# Patient Record
Sex: FEMALE | Race: WHITE | NOT HISPANIC OR LATINO | Employment: FULL TIME | URBAN - METROPOLITAN AREA
[De-identification: names, ages, dates, MRNs, and addresses within clinical notes are randomized per-mention and may not be internally consistent; named-entity substitution may affect disease eponyms.]

---

## 2024-04-22 ENCOUNTER — TRANSCRIBE ORDERS (OUTPATIENT)
Dept: NEUROLOGY | Facility: CLINIC | Age: 39
End: 2024-04-22

## 2024-04-22 DIAGNOSIS — G56.03 CARPAL TUNNEL SYNDROME, BILATERAL: Primary | ICD-10-CM

## 2024-04-30 ENCOUNTER — PROCEDURE VISIT (OUTPATIENT)
Dept: NEUROLOGY | Facility: CLINIC | Age: 39
End: 2024-04-30
Payer: COMMERCIAL

## 2024-04-30 DIAGNOSIS — M79.602 PAIN OF LEFT UPPER EXTREMITY: Primary | ICD-10-CM

## 2024-04-30 DIAGNOSIS — M79.601 PAIN OF RIGHT UPPER EXTREMITY: ICD-10-CM

## 2024-04-30 DIAGNOSIS — G56.03 CARPAL TUNNEL SYNDROME, BILATERAL: ICD-10-CM

## 2024-04-30 DIAGNOSIS — R20.0 NUMBNESS: ICD-10-CM

## 2024-04-30 PROCEDURE — 95886 MUSC TEST DONE W/N TEST COMP: CPT | Performed by: PSYCHIATRY & NEUROLOGY

## 2024-04-30 PROCEDURE — 95912 NRV CNDJ TEST 11-12 STUDIES: CPT | Performed by: PSYCHIATRY & NEUROLOGY

## 2024-04-30 NOTE — PROGRESS NOTES
Nerve conduction studies and needle EMG was performed today on this patient.  Full scanned report is available in the Procedure tab in Epic (Chart Review, Procedure tab, double-click the report to enlarge it).  Note:  Dr. Trujillo remains available to perform EMG studies until 2024, when he will be retiring.    Impression:  Nerve conduction study and needle examination of the bilateral upper extremities were performed, see details in table. The results were abnormal because of the followin. Bilaterally prolonged median sensory latencies, prolonged right median motor latency. Consistent with mild bilateral median neuropathy at the wrist (carpal tunnel syndrome), a bit more prominent on the right compared to the left side.      Kian Trujillo MD

## 2024-06-11 PROCEDURE — 88305 TISSUE EXAM BY PATHOLOGIST: CPT

## 2024-06-12 ENCOUNTER — LAB REQUISITION (OUTPATIENT)
Dept: LAB | Facility: HOSPITAL | Age: 39
End: 2024-06-12
Payer: COMMERCIAL

## 2024-06-12 DIAGNOSIS — G56.01 CARPAL TUNNEL SYNDROME, RIGHT UPPER LIMB: ICD-10-CM

## 2024-06-12 DIAGNOSIS — M65.331 TRIGGER FINGER, RIGHT MIDDLE FINGER: ICD-10-CM

## 2024-06-13 LAB
LABORATORY COMMENT REPORT: NORMAL
PATH REPORT.FINAL DX SPEC: NORMAL
PATH REPORT.GROSS SPEC: NORMAL
PATH REPORT.RELEVANT HX SPEC: NORMAL
PATH REPORT.TOTAL CANCER: NORMAL

## 2024-11-19 ENCOUNTER — APPOINTMENT (OUTPATIENT)
Dept: AUDIOLOGY | Facility: CLINIC | Age: 39
End: 2024-11-19
Payer: COMMERCIAL

## 2024-11-19 ENCOUNTER — APPOINTMENT (OUTPATIENT)
Dept: OTOLARYNGOLOGY | Facility: CLINIC | Age: 39
End: 2024-11-19
Payer: COMMERCIAL

## 2024-11-19 VITALS — WEIGHT: 169 LBS | HEIGHT: 63 IN | BODY MASS INDEX: 29.95 KG/M2 | TEMPERATURE: 97.1 F

## 2024-11-19 DIAGNOSIS — H90.12 CONDUCTIVE HEARING LOSS OF LEFT EAR, UNSPECIFIED HEARING STATUS ON CONTRALATERAL SIDE: ICD-10-CM

## 2024-11-19 DIAGNOSIS — H69.92 DYSFUNCTION OF LEFT EUSTACHIAN TUBE: Primary | ICD-10-CM

## 2024-11-19 DIAGNOSIS — B99.9 INFECTION: ICD-10-CM

## 2024-11-19 DIAGNOSIS — J31.0 CHRONIC RHINITIS: ICD-10-CM

## 2024-11-19 DIAGNOSIS — H90.0 CONDUCTIVE HEARING LOSS, BILATERAL: Primary | ICD-10-CM

## 2024-11-19 PROCEDURE — 92550 TYMPANOMETRY & REFLEX THRESH: CPT | Performed by: AUDIOLOGIST

## 2024-11-19 PROCEDURE — 31231 NASAL ENDOSCOPY DX: CPT | Performed by: OTOLARYNGOLOGY

## 2024-11-19 PROCEDURE — 3008F BODY MASS INDEX DOCD: CPT | Performed by: OTOLARYNGOLOGY

## 2024-11-19 PROCEDURE — 92557 COMPREHENSIVE HEARING TEST: CPT | Performed by: AUDIOLOGIST

## 2024-11-19 PROCEDURE — 99203 OFFICE O/P NEW LOW 30 MIN: CPT | Performed by: OTOLARYNGOLOGY

## 2024-11-19 RX ORDER — ACETAMINOPHEN 500 MG
TABLET ORAL DAILY
COMMUNITY

## 2024-11-19 RX ORDER — BUPRENORPHINE AND NALOXONE 12; 3 MG/1; MG/1
FILM, SOLUBLE BUCCAL; SUBLINGUAL
COMMUNITY

## 2024-11-19 RX ORDER — FUROSEMIDE 20 MG/1
0.5 TABLET ORAL DAILY
COMMUNITY
Start: 2023-07-12

## 2024-11-19 RX ORDER — RIFAXIMIN 550 MG/1
TABLET ORAL
COMMUNITY
Start: 2024-04-29

## 2024-11-19 RX ORDER — FLUTICASONE PROPIONATE 50 MCG
SPRAY, SUSPENSION (ML) NASAL
Qty: 16 G | Refills: 11 | Status: SHIPPED | OUTPATIENT
Start: 2024-11-19

## 2024-11-19 RX ORDER — DOCUSATE SODIUM 100 MG
CAPSULE ORAL
COMMUNITY

## 2024-11-19 RX ORDER — FAMOTIDINE 40 MG/1
TABLET, FILM COATED ORAL
COMMUNITY

## 2024-11-19 RX ORDER — PREDNISONE 5 MG/1
TABLET ORAL
Qty: 21 TABLET | Refills: 0 | Status: SHIPPED | OUTPATIENT
Start: 2024-11-19

## 2024-11-19 RX ORDER — SPIRONOLACTONE 50 MG/1
50 TABLET, FILM COATED ORAL DAILY
COMMUNITY

## 2024-11-19 RX ORDER — FLUOXETINE HYDROCHLORIDE 40 MG/1
80 CAPSULE ORAL DAILY
COMMUNITY

## 2024-11-19 NOTE — PROGRESS NOTES
AUDIOLOGY ADULT AUDIOMETRIC EVALUATION    Name:  Omaira Coulter  :  1985  Age:  39 y.o.  Date of Evaluation:  2024    Reason for visit: Ms. Coulter is seen in the clinic today at the request of otolaryngology for an audiologic evaluation.     HISTORY  Patient complains of muffled hearing with left worse than right in the last few months.  She reports having tubes in past and denies tinnitus, dizzy and fullness.  She has worked in noise.    EVALUATION  See scanned audiogram: “Media” > “Audiology Report”.      RESULTS  Otoscopic Evaluation:  Right Ear: abnormal appearance  Left Ear:    abnormal appearance    Immittance Measures:  Tympanometry:  Right Ear:  Type B, reduced tympanic membrane mobility   Left Ear:     Type B, reduced tympanic membrane mobility     Acoustic Reflexes:  Ipsilateral Right Ear:  NR NR NR   Ipsilateral Left Ear:     NR NR NR   Contralateral Right Ear: did not evaluate  Contralateral Left Ear: did not evaluate      Audiometry:  Test Technique and Reliability: BEHAVIORAL  Standard audiometry via INSERTS. Reliability is good.    Pure tone air and bone conduction audiometry:  Right Ear:  MILD CHL RISING TO WITHIN NORMAL LIMITS AT 2 K HZ - 8 K HZ   Left Ear:  MILD - 8 K HZ.     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  The presence of acoustic reflexes within normal intensity limits is consistent with normal middle ear and brainstem function, and suggests that auditory sensitivity is not significantly impaired. An elevated or absent acoustic reflex threshold is consistent with a middle ear disorder, hearing loss in the stimulated ear, and/or interruption of neural innervation of the stapedius muscle. Present DPOAEs suggest normal/near normal cochlear outer hair cell function and are consistent with no greater than a mild hearing loss at those  frequencies. Absent DPOAEs are consistent with abnormal cochlear outer hair cell function and some degree of hearing loss at those frequencies.    RECOMMENDATIONS  - Follow up with otolaryngology today as scheduled.  - Audiologic evaluation as needed.  - Annual audiologic evaluation, sooner if an acute change is noted.  - Audiologic evaluation in conjunction with otologic care, if an acute change is noted, and/or annually.  -- Follow-up with medical care team as planned.    PATIENT EDUCATION  Discussed results, impressions and recommendations with the patient. Questions were addressed and the patient was encouraged to contact our office should concerns arise.    Time for this encounter: 30 MINUTS     Chantelle French  Licensed Audiologist

## 2024-11-19 NOTE — PROGRESS NOTES
"NOAH Coulter is a 39 y.o. female history of dermatitis and long history of eustachian tube dysfunction status post multiple sets of tubes recently developed left-sided hearing loss.  She has been treated with eardrops and symptoms persist.  Audiogram today demonstrated left mild conductive hearing loss and type B tympanograms bilaterally.  Right side borderline thresholds.  She denies otalgia and otorrhea.  She has not been sick.  She has not been flying.      Past Medical History:   Diagnosis Date    Cirrhosis of liver (Multi)     Personal history of other diseases of the circulatory system     History of hypertension    Personal history of other diseases of the respiratory system     History of asthma            Medications:     Current Outpatient Medications:     buprenorphine-naloxone (Suboxone) 12-3 mg per sublingual film, Place under the tongue., Disp: , Rfl:     cholecalciferol (Vitamin D-3) 50 mcg (2,000 unit) capsule, Take by mouth once daily., Disp: , Rfl:     famotidine (Pepcid) 40 mg tablet, Take 1 tablet by mouth once daily. as needed for heartburn in additon to present GERD medication, Disp: , Rfl:     FLUoxetine (PROzac) 40 mg capsule, Take 2 capsules (80 mg) by mouth once daily., Disp: , Rfl:     furosemide (Lasix) 20 mg tablet, Take 0.5 tablets (10 mg) by mouth once daily., Disp: , Rfl:     OMEPRAZOLE, BULK, MISC, , Disp: , Rfl:     spironolactone (Aldactone) 50 mg tablet, Take 1 tablet (50 mg) by mouth once daily., Disp: , Rfl:     Stool Softener 100 mg capsule, Take 1 capsule by mouth two times a day. as needed for constipaiton, Disp: , Rfl:     Xifaxan 550 mg tablet, , Disp: , Rfl:      Allergies:  No Known Allergies     Physical Exam:  Last Recorded Vitals  Temperature 36.2 °C (97.1 °F), height 1.6 m (5' 3\"), weight 76.7 kg (169 lb).  General:     General appearance: Well-developed, well-nourished in no acute distress.       Voice:  normal       Head/face: Normal appearance; nontender to " palpation     Facial nerve function: Normal and symmetric bilaterally.    Oral/oropharynx:     Oral vestibule: Normal labial and gingival mucosa     Tongue/floor of mouth: Normal without lesion     Oropharynx: Clear.  No lesions present of the hard/soft palate, posterior pharynx    Neck:     Neck: Normal appearance, trachea midline     Salivary glands: Normal to palpation bilaterally     Lymph nodes: No cervical lymphadenopathy to palpation     Thyroid: No thyromegaly.  No palpable nodules     Range of motion: Normal    Neurological:     Cortical functions: Alert and oriented x3, appropriate affect       Larynx/hypopharynx:     Laryngeal findings: Mirror exam inadequate or limited secondary to enlarged base of tongue and/or excessive gagging    Ear:     Ear canal: Normal bilaterally     Tympanic membrane: Intact bilaterally.  Myringosclerosis bilaterally.  Appears to have air-fluid levels serous effusion on the left.     Pinna: Normal bilaterally     Hearing:  Gross hearing assessment normal by voice    Nose:     Visualized using: Flexible nasal endoscopy utilized secondary to inadequate anterior rhinoscopy  Nasopharynx: Inadequate mirror examination as above, endoscopy demonstrates normal nasopharynx without obstruction or mass   nasal dorsum: Nontraumatic midline appearance     Septum: Midline     Inferior turbinates: Normally sized     Mucosa: Bilateral, pink, normal appearing       ASSESSMENT/PLAN:  Left middle ear effusion accounting for her symptoms.  Recommend short trial of prednisone, fluticasone.  We talked about myringotomy and may consider but would like to give this time and some medical treatment first.  Recheck in a month, sooner as needed        Kwabena Brown MD

## 2024-12-17 ENCOUNTER — APPOINTMENT (OUTPATIENT)
Dept: OTOLARYNGOLOGY | Facility: CLINIC | Age: 39
End: 2024-12-17
Payer: COMMERCIAL